# Patient Record
Sex: FEMALE | Race: WHITE | URBAN - METROPOLITAN AREA
[De-identification: names, ages, dates, MRNs, and addresses within clinical notes are randomized per-mention and may not be internally consistent; named-entity substitution may affect disease eponyms.]

---

## 2024-08-29 ENCOUNTER — HOSPITAL ENCOUNTER (OUTPATIENT)
Age: 64
Discharge: HOME | End: 2024-08-29
Payer: COMMERCIAL

## 2024-08-29 VITALS
DIASTOLIC BLOOD PRESSURE: 79 MMHG | SYSTOLIC BLOOD PRESSURE: 132 MMHG | TEMPERATURE: 98 F | HEART RATE: 84 BPM | RESPIRATION RATE: 20 BRPM | OXYGEN SATURATION: 99 %

## 2024-08-29 DIAGNOSIS — J06.9 VIRAL UPPER RESPIRATORY TRACT INFECTION WITH COUGH: ICD-10-CM

## 2024-08-29 DIAGNOSIS — J02.0 STREPTOCOCCAL SORE THROAT: Primary | ICD-10-CM

## 2024-08-29 PROBLEM — I25.10 ATHEROSCLEROSIS OF CORONARY ARTERY: Status: ACTIVE | Noted: 2021-09-21

## 2024-08-29 LAB — S PYO AG THROAT QL: POSITIVE

## 2024-08-29 PROCEDURE — 99203 OFFICE O/P NEW LOW 30 MIN: CPT | Performed by: NURSE PRACTITIONER

## 2024-08-29 PROCEDURE — 87880 STREP A ASSAY W/OPTIC: CPT | Performed by: NURSE PRACTITIONER

## 2024-08-29 RX ORDER — PENICILLIN V POTASSIUM 500 MG/1
500 TABLET, FILM COATED ORAL 2 TIMES DAILY
Qty: 20 TABLET | Refills: 0 | Status: SHIPPED | OUTPATIENT
Start: 2024-08-29 | End: 2024-09-08

## 2024-08-29 NOTE — ED INITIAL ASSESSMENT (HPI)
Pt came in due to cough, congestion, runny nose, and sore throat for the past week. Pt stated she took a home covid test that was negative. Pt c/o left ear pain as well. Pt has easy non labored respirations.

## 2024-08-29 NOTE — ED PROVIDER NOTES
Patient Seen in: [facility]      History     Chief Complaint   Patient presents with    Cough/URI     Stated Complaint: cough, ear pain    Subjective:   Well-appearing 64-year-old female with anxiety and coronary artery disease presents complaints of a nonproductive cough, nasal congestion, intermittent runny nose and a sore throat since this past Friday.  Patient communicates that she had a negative at-home COVID-19 test this past Monday.  Patient communicates that now pain is radiating to her left ear.  Patient denies fever or chills.  Patient denies difficulty swallowing or drooling.  No over-the-counter medications have been taken for symptoms.          Objective:   History reviewed. No pertinent past medical history.           History reviewed. No pertinent surgical history.             No pertinent social history.            Review of Systems    Positive for stated Chief Complaint: Cough/URI    Other systems are as noted in HPI.  Constitutional and vital signs reviewed.      All other systems reviewed and negative except as noted above.    Physical Exam     ED Triage Vitals [08/29/24 0825]   /79   Pulse 84   Resp 20   Temp 98.2 °F (36.8 °C)   Temp src Temporal   SpO2 99 %   O2 Device None (Room air)       Current Vitals:   Vital Signs  BP: 132/79  Pulse: 84  Resp: 20  Temp: 98.2 °F (36.8 °C)  Temp src: Temporal    Oxygen Therapy  SpO2: 99 %  O2 Device: None (Room air)        Physical Exam  VS: Vital signs reviewed. 02 saturation within normal limits for this patient.    General: Patient is awake and alert, oriented to person, place and time. Pt appears non-toxic.     HEENT: Head is normocephalic, atraumatic. Pupils reactive bilaterally. Nonicteric sclera, no conjunctival injection. No facial droop or slurred speech. No oral lesions or pallor. Mucous membranes moist. Left and right tympanic membranes normal.      Right Ear: Ear canal and external ear normal. A middle ear effusion is  present. Tympanic membrane is not injected or erythematous.      Left Ear: Ear canal and external ear normal. A middle ear effusion is present. Tympanic membrane is not injected or erythematous.      Nose: Congestion present. No rhinorrhea.      Mouth/Throat:      Lips: Pink.      Mouth: Mucous membranes are moist.      Pharynx: Uvula midline. Posterior oropharyngeal erythema present. No pharyngeal swelling, oropharyngeal exudate or uvula swelling.      Tonsils: No tonsillar exudate or tonsillar abscesses. 1+ on the right. 1+ on the left.     Neck: No cervical lymphadenopathy. Supple. Normal ROM.    Heart: Regular rate and rhythm, normal S1, normal S2.    Lungs: Clear to auscultation. Good inspiratory effort. + Airway entry bilaterally without wheezes, rhonchi or crackles. No accessory muscle use or tachypnea.    Extremities: No focal swelling or tenderness. Capillary refill noted.     Skin: Warm, dry and normal in color.     Psychiatric: Normal affect, judgement normal, insight normal.     CNS: Moves all 4 extremities. Interacts appropriately. No gait abnormality. Memory normal.        ED Course     Labs Reviewed   POCT RAPID STREP - Abnormal; Notable for the following components:       Result Value    POCT Rapid Strep Positive (*)     All other components within normal limits     MDM   Medical Decision Making  Well-appearing.  Rapid strep positive.  Patient communicates a negative at-home COVID-19 test, declined further COVID-19 testing here.  No PTA, uvular swelling or deviation.  Prescription for penicillin twice daily x 10 days was sent to pharmacy on file for treatment of streptococcal sore throat.  I discussed over-the-counter acetaminophen and/or ibuprofen as needed for pain.  We also discussed warm water start gargles.  Differential diagnosis considered included streptococcal sore throat versus viral pharyngitis versus COVID-19.  Close PMD follow-up as well as return precautions discussed.    Problems  Addressed:  Streptococcal sore throat: acute illness or injury  Viral upper respiratory tract infection with cough: acute illness or injury    Amount and/or Complexity of Data Reviewed  Labs: ordered. Decision-making details documented in ED Course.    Risk  OTC drugs.  Prescription drug management.        Disposition and Plan     Clinical Impression:  1. Streptococcal sore throat    2. Viral upper respiratory tract infection with cough         Disposition:  Discharge  8/29/2024  9:13 am    Follow-up:  Gladys Obrien MD  [address] [id number]  [phone]    In 1 week  As needed          Medications Prescribed:  Discharge Medication List as of 8/29/2024  9:16 AM        START taking these medications    Details   penicillin v potassium 500 MG Oral Tab Take 1 tablet (500 mg total) by mouth in the morning and 1 tablet (500 mg total) before bedtime. Do all this for 10 days., Normal, Disp-20 tablet, R-0